# Patient Record
Sex: MALE | Race: ASIAN
[De-identification: names, ages, dates, MRNs, and addresses within clinical notes are randomized per-mention and may not be internally consistent; named-entity substitution may affect disease eponyms.]

---

## 2020-08-20 ENCOUNTER — HOSPITAL ENCOUNTER (EMERGENCY)
Dept: HOSPITAL 72 - EMR | Age: 30
Discharge: HOME | End: 2020-08-20
Payer: COMMERCIAL

## 2020-08-20 VITALS — DIASTOLIC BLOOD PRESSURE: 74 MMHG | SYSTOLIC BLOOD PRESSURE: 127 MMHG

## 2020-08-20 VITALS — HEIGHT: 68 IN | BODY MASS INDEX: 22.73 KG/M2 | WEIGHT: 150 LBS

## 2020-08-20 VITALS — DIASTOLIC BLOOD PRESSURE: 76 MMHG | SYSTOLIC BLOOD PRESSURE: 132 MMHG

## 2020-08-20 DIAGNOSIS — L02.01: Primary | ICD-10-CM

## 2020-08-20 PROCEDURE — 99283 EMERGENCY DEPT VISIT LOW MDM: CPT

## 2020-08-20 NOTE — EMERGENCY ROOM REPORT
History of Present Illness


General


Chief Complaint:  Skin Rash/Abscess


Source:  Patient





Present Illness


HPI


Disclaimer: Please note that this report is being documented using [employer]AG[employer] 

technology. This can lead to erroneous entry secondary to incorrect 

interpretation by the dictating instrument.





HPI: 29-year-old male presents for swelling under his chin.  States there is 

been a lump there for approximately 1 year but it became acutely painful 

yesterday and believes it grew in size.  No drainage, no bleeding.  He was seen 

at urgent care and given an intramuscular dose of antibiotics as well as a 

prescription for oral antibiotics but is not yet picked up his prescription.  

Denies fever, chills, nausea, vomiting, difficulty swallowing, swelling of the 

neck, stridor or other symptoms.


 


PMH: Denied


 


PSH: Denied


 


Allergies: Denied


 


Social Hx: Denied


Allergies:  


Coded Allergies:  


     No Known Allergies (Unverified , 8/20/20)





COVID-19 Screening


Contact w/high risk pt:  No


Experienced COVID-19 symptoms?:  No


COVID-19 Testing performed PTA:  No





Nursing Documentation-PMH


Past Medical History:  No Stated History





Review of Systems


All Other Systems:  negative except mentioned in HPI





Physical Exam





Vital Signs








  Date Time  Temp Pulse Resp B/P (MAP) Pulse Ox O2 Delivery O2 Flow Rate FiO2


 


8/20/20 16:33 98.4 69 16 129/81 (97) 97 Room Air  





 





General: Awake and alert, no acute distress


HEENT: NC/AT. EOMI. dentition intact, uvula midline, no obstruction, tolerating 

secretions, no stridor, speaking in full sentences.  There is a tender 

erythematous raised fluctuant mass in the midline under the mandible.  There is 

a pustule head on top.  No active drainage or bleeding.  No surrounding 

swelling or erythema.


Resp: Normal work of breathing


Skin: Intact.  No abrasions, laceration or rash over the exposed skin


MSK: Normal tone and bulk. Moving all extremities.  No obvious deformity.


Neuro: Awake and alert.  Mentating appropriately





Procedures


Incision and Drainage


Incision and Drainage :  


   Consent:  Verbal


   Blade Size:  11


   I & D Procedure:  betadine prep, sterile drapes applied, sterile dressing 

applied


   Wound Location:  face


   Wound's Depth, Shape:  superficial, linear


   Wound Length (cm):  1


   Wound Explored:  clean


   Irrigated w/ Saline (ccs):  20


   Anesthesia:  Lidocaine w/ Epi


   Volume Anesthetic (ccs):  5


   Patient Tolerated:  Well


   Complications:  None





Medical Decision Making


Diagnostic Impression:  


 Primary Impression:  


 Facial abscess


ER Course


29-year-old male presents with painful swelling under his chin.  Concern for 

cellulitis, abscess, lipoma.  Bedside ultrasound confirmed fluid collection 

consistent with abscess.  No signs of systemic infection.  Patient underwent 

incision and drainage at bedside by me which expressed a significant amount of 

purulent material.  Wound was irrigated and bandage applied.  See separate 

procedure section for full procedure details.  Tolerated procedure well.  No 

significant bleeding.  Patient be started on oral antibiotics.  We will follow-

up with PMD.  Discussed reasons to return to ED.  He understands and agrees 

with treatment plan.


Diagnostic POCUS


Bedside Ultrasound Diagnostics:  


   Bedside US Exam performed:  Soft Tissue Limited


   Indication:  Abcess/Cellulitis


   Number of Views:  Limited


   Interpreted by Emergency Physi:  Yes


   Soft Tissue Limited Findings:  No cobblestone appearance, No foreign body, 

Other - Fluid-filled cavity without flow consistent with abscess


   Impression:  Abscess


   Electronically Signed by:


Electronically signed by Dr. Ant Monae





Last Vital Signs








  Date Time  Temp Pulse Resp B/P (MAP) Pulse Ox O2 Delivery O2 Flow Rate FiO2


 


8/20/20 16:33 98.4 69 16 129/81 (97) 97 Room Air  








Disposition:  HOME, SELF-CARE


Condition:  Stable


Scripts


Cephalexin* (KEFLEX*) 500 Mg Capsule


500 MG ORAL EVERY 6 HOURS for 7 Days, #28 CAP


   Prov: Ant Monae MD         8/20/20











Ant Monae MD Aug 20, 2020 16:47